# Patient Record
Sex: FEMALE | Race: WHITE | NOT HISPANIC OR LATINO
[De-identification: names, ages, dates, MRNs, and addresses within clinical notes are randomized per-mention and may not be internally consistent; named-entity substitution may affect disease eponyms.]

---

## 2023-01-30 ENCOUNTER — DASHBOARD ENCOUNTERS (OUTPATIENT)
Age: 23
End: 2023-01-30

## 2023-01-31 ENCOUNTER — OFFICE VISIT (OUTPATIENT)
Dept: URBAN - METROPOLITAN AREA CLINIC 44 | Facility: CLINIC | Age: 23
End: 2023-01-31
Payer: COMMERCIAL

## 2023-01-31 VITALS
DIASTOLIC BLOOD PRESSURE: 77 MMHG | WEIGHT: 128.6 LBS | BODY MASS INDEX: 21.43 KG/M2 | HEART RATE: 65 BPM | SYSTOLIC BLOOD PRESSURE: 123 MMHG | TEMPERATURE: 97.9 F | HEIGHT: 65 IN

## 2023-01-31 DIAGNOSIS — R74.8 ELEVATED LIVER ENZYMES: ICD-10-CM

## 2023-01-31 PROCEDURE — 99203 OFFICE O/P NEW LOW 30 MIN: CPT | Performed by: INTERNAL MEDICINE

## 2023-01-31 NOTE — HPI-TODAY'S VISIT:
21 yo F presents for an OV for evaluation of elevated liver enzymes. She presented to the ER last month after feeling of malaise and poor appetite.  Labs: 12/21/22 T bili 1.2, Direct bilirubin 0.4, Alkaline phosphatase 269, AST 80, , negative acute hepatisis panel. On that visit, she associated headache and fatigue. I reviewed the ER notes. Per the physician note, the patient's father endorses drinking coffee several times per day and vigourous exercising. Urine panel was consistent with a urinary tract infection. She mentions having an infection with mono last month. I do not have a record of this lab. She denies alcohol use(currently), hepatitis exposure, recent travel or sick contact.